# Patient Record
Sex: MALE | Race: WHITE | NOT HISPANIC OR LATINO
[De-identification: names, ages, dates, MRNs, and addresses within clinical notes are randomized per-mention and may not be internally consistent; named-entity substitution may affect disease eponyms.]

---

## 2017-02-21 PROBLEM — Z00.00 ENCOUNTER FOR PREVENTIVE HEALTH EXAMINATION: Status: ACTIVE | Noted: 2017-02-21

## 2017-02-23 ENCOUNTER — APPOINTMENT (OUTPATIENT)
Dept: ORTHOPEDIC SURGERY | Facility: CLINIC | Age: 27
End: 2017-02-23

## 2017-02-23 VITALS
BODY MASS INDEX: 28 KG/M2 | DIASTOLIC BLOOD PRESSURE: 72 MMHG | HEIGHT: 71 IN | WEIGHT: 200 LBS | SYSTOLIC BLOOD PRESSURE: 132 MMHG | HEART RATE: 66 BPM

## 2017-02-23 DIAGNOSIS — Z80.3 FAMILY HISTORY OF MALIGNANT NEOPLASM OF BREAST: ICD-10-CM

## 2017-02-23 DIAGNOSIS — Z87.09 PERSONAL HISTORY OF OTHER DISEASES OF THE RESPIRATORY SYSTEM: ICD-10-CM

## 2017-02-23 DIAGNOSIS — Z78.9 OTHER SPECIFIED HEALTH STATUS: ICD-10-CM

## 2017-02-23 RX ORDER — FLUTICASONE PROPIONATE 50 MCG
SPRAY, SUSPENSION NASAL
Refills: 0 | Status: ACTIVE | COMMUNITY

## 2017-03-17 ENCOUNTER — APPOINTMENT (OUTPATIENT)
Dept: ORTHOPEDIC SURGERY | Facility: CLINIC | Age: 27
End: 2017-03-17

## 2017-03-17 DIAGNOSIS — S62.346D NONDISPLACED FRACTURE OF BASE OF FIFTH METACARPAL BONE, RIGHT HAND, SUBSEQUENT ENCOUNTER FOR FRACTURE WITH ROUTINE HEALING: ICD-10-CM

## 2017-03-17 DIAGNOSIS — S62.346A NONDISPLACED FRACTURE OF BASE OF FIFTH METACARPAL BONE, RIGHT HAND, INITIAL ENCOUNTER FOR CLOSED FRACTURE: ICD-10-CM

## 2017-09-25 ENCOUNTER — APPOINTMENT (OUTPATIENT)
Dept: ORTHOPEDIC SURGERY | Facility: CLINIC | Age: 27
End: 2017-09-25
Payer: COMMERCIAL

## 2017-09-25 PROCEDURE — 99214 OFFICE O/P EST MOD 30 MIN: CPT

## 2017-10-18 ENCOUNTER — RESULT REVIEW (OUTPATIENT)
Age: 27
End: 2017-10-18

## 2017-10-31 ENCOUNTER — APPOINTMENT (OUTPATIENT)
Dept: ORTHOPEDIC SURGERY | Facility: CLINIC | Age: 27
End: 2017-10-31
Payer: COMMERCIAL

## 2017-10-31 DIAGNOSIS — M25.311 OTHER INSTABILITY, RIGHT SHOULDER: ICD-10-CM

## 2017-10-31 PROCEDURE — 99214 OFFICE O/P EST MOD 30 MIN: CPT

## 2017-10-31 PROCEDURE — 73030 X-RAY EXAM OF SHOULDER: CPT | Mod: RT

## 2017-12-15 ENCOUNTER — OUTPATIENT (OUTPATIENT)
Dept: OUTPATIENT SERVICES | Facility: HOSPITAL | Age: 27
LOS: 1 days | Discharge: HOME | End: 2017-12-15

## 2017-12-15 VITALS
OXYGEN SATURATION: 97 % | DIASTOLIC BLOOD PRESSURE: 73 MMHG | HEART RATE: 71 BPM | TEMPERATURE: 98 F | HEIGHT: 71 IN | WEIGHT: 216.71 LBS | SYSTOLIC BLOOD PRESSURE: 121 MMHG | RESPIRATION RATE: 18 BRPM

## 2017-12-15 DIAGNOSIS — Z01.818 ENCOUNTER FOR OTHER PREPROCEDURAL EXAMINATION: ICD-10-CM

## 2017-12-15 DIAGNOSIS — K21.9 GASTRO-ESOPHAGEAL REFLUX DISEASE WITHOUT ESOPHAGITIS: ICD-10-CM

## 2017-12-15 DIAGNOSIS — Z90.89 ACQUIRED ABSENCE OF OTHER ORGANS: Chronic | ICD-10-CM

## 2017-12-15 DIAGNOSIS — S43.431S SUPERIOR GLENOID LABRUM LESION OF RIGHT SHOULDER, SEQUELA: ICD-10-CM

## 2017-12-15 DIAGNOSIS — J30.2 OTHER SEASONAL ALLERGIC RHINITIS: ICD-10-CM

## 2017-12-15 NOTE — H&P PST ADULT - HISTORY OF PRESENT ILLNESS
27M pmh gerd, seasonal allergies c/o Right shoulder pain s/p fall ~1year ago found to have labral tear here for presurgical testing for scheduled Right shoulder arthroscopy 27M pmh gerd, seasonal allergies c/o Right shoulder pain s/p fall ~1year ago found to have superior glenoid labrum tear of right shoulder here for presurgical testing for scheduled Right shoulder arthroscopy

## 2017-12-15 NOTE — H&P PST ADULT - ASSESSMENT
27M pmh gerd, seasonal allergies c/o Right shoulder pain s/p fall ~1year ago found to have superior glenoid labrum tear of right shoulder here for presurgical testing for scheduled Right shoulder arthroscopy

## 2017-12-18 ENCOUNTER — APPOINTMENT (OUTPATIENT)
Dept: ORTHOPEDIC SURGERY | Facility: HOSPITAL | Age: 27
End: 2017-12-18

## 2017-12-18 ENCOUNTER — RESULT REVIEW (OUTPATIENT)
Age: 27
End: 2017-12-18

## 2017-12-18 ENCOUNTER — OUTPATIENT (OUTPATIENT)
Dept: OUTPATIENT SERVICES | Facility: HOSPITAL | Age: 27
LOS: 1 days | Discharge: HOME | End: 2017-12-18
Payer: COMMERCIAL

## 2017-12-18 VITALS
HEIGHT: 71 IN | HEART RATE: 78 BPM | TEMPERATURE: 98 F | RESPIRATION RATE: 17 BRPM | DIASTOLIC BLOOD PRESSURE: 73 MMHG | OXYGEN SATURATION: 100 % | SYSTOLIC BLOOD PRESSURE: 118 MMHG | WEIGHT: 210.1 LBS

## 2017-12-18 VITALS
DIASTOLIC BLOOD PRESSURE: 72 MMHG | OXYGEN SATURATION: 96 % | HEART RATE: 62 BPM | RESPIRATION RATE: 18 BRPM | SYSTOLIC BLOOD PRESSURE: 123 MMHG

## 2017-12-18 DIAGNOSIS — Z90.89 ACQUIRED ABSENCE OF OTHER ORGANS: Chronic | ICD-10-CM

## 2017-12-18 PROCEDURE — 29806 SHO ARTHRS SRG CAPSULORRAPHY: CPT | Mod: RT

## 2017-12-18 PROCEDURE — 29807 SHO ARTHRS SRG RPR SLAP LES: CPT | Mod: 59,RT

## 2017-12-18 PROCEDURE — 88304 TISSUE EXAM BY PATHOLOGIST: CPT | Mod: 26

## 2017-12-18 RX ORDER — ONDANSETRON 8 MG/1
4 TABLET, FILM COATED ORAL ONCE
Qty: 0 | Refills: 0 | Status: DISCONTINUED | OUTPATIENT
Start: 2017-12-18 | End: 2017-12-18

## 2017-12-18 RX ORDER — FEXOFENADINE HCL 30 MG
1 TABLET ORAL
Qty: 0 | Refills: 0 | COMMUNITY

## 2017-12-18 RX ORDER — OXYCODONE HYDROCHLORIDE 5 MG/1
1 TABLET ORAL
Qty: 42 | Refills: 0 | OUTPATIENT
Start: 2017-12-18 | End: 2017-12-24

## 2017-12-18 RX ORDER — IBUPROFEN 200 MG
2 TABLET ORAL
Qty: 0 | Refills: 0 | COMMUNITY

## 2017-12-18 RX ORDER — MORPHINE SULFATE 50 MG/1
4 CAPSULE, EXTENDED RELEASE ORAL
Qty: 0 | Refills: 0 | Status: DISCONTINUED | OUTPATIENT
Start: 2017-12-18 | End: 2017-12-18

## 2017-12-18 RX ORDER — SODIUM CHLORIDE 9 MG/ML
1000 INJECTION, SOLUTION INTRAVENOUS
Qty: 0 | Refills: 0 | Status: DISCONTINUED | OUTPATIENT
Start: 2017-12-18 | End: 2017-12-18

## 2017-12-18 RX ORDER — ONDANSETRON 8 MG/1
1 TABLET, FILM COATED ORAL
Qty: 42 | Refills: 0 | OUTPATIENT
Start: 2017-12-18 | End: 2017-12-24

## 2017-12-18 RX ORDER — FLUTICASONE PROPIONATE 50 MCG
1 SPRAY, SUSPENSION NASAL
Qty: 0 | Refills: 0 | COMMUNITY

## 2017-12-18 RX ORDER — DOCUSATE SODIUM 100 MG
1 CAPSULE ORAL
Qty: 21 | Refills: 0 | OUTPATIENT
Start: 2017-12-18 | End: 2017-12-24

## 2017-12-18 NOTE — ASU DISCHARGE PLAN (ADULT/PEDIATRIC). - NOTIFY
Bleeding that does not stop/Inability to Tolerate Liquids or Foods/Fever greater than 101/Swelling that continues/Pain not relieved by Medications

## 2017-12-18 NOTE — ASU DISCHARGE PLAN (ADULT/PEDIATRIC). - MEDICATION SUMMARY - MEDICATIONS TO TAKE
I will START or STAY ON the medications listed below when I get home from the hospital:    Advil 200 mg oral tablet  -- 2 tab(s) by mouth prn  -- Indication: For home medication    oxyCODONE 5 mg oral tablet  -- 1 tab(s) by mouth every 4 hours, As Needed -for severe pain MDD:6  -- Caution federal law prohibits the transfer of this drug to any person other  than the person for whom it was prescribed.  It is very important that you take or use this exactly as directed.  Do not skip doses or discontinue unless directed by your doctor.  May cause drowsiness.  Alcohol may intensify this effect.  Use care when operating dangerous machinery.  This prescription cannot be refilled.  Using more of this medication than prescribed may cause serious breathing problems.    -- Indication: For prn severe pain    Zofran 4 mg oral tablet  -- 1 tab(s) by mouth every 4 hours, As Needed for nausea.  -- Indication: For prn nausea    Allegra  -- 1 tab(s) by mouth once a day  -- Indication: For home med    Colace 100 mg oral capsule  -- 1 cap(s) by mouth 3 times a day, As Needed -for constipation MDD:3  -- Medication should be taken with plenty of water.    -- Indication: For prn constipation while taking pain medication    Flonase  -- 1 spray(s) into nose once a day (at bedtime)  -- Indication: For home med

## 2017-12-18 NOTE — BRIEF OPERATIVE NOTE - PROCEDURE
<<-----Click on this checkbox to enter Procedure Arthroscopic repair of SLAP lesion of shoulder  12/18/2017  and bankhart repair  Active  N[facility]ANE

## 2017-12-19 ENCOUNTER — TRANSCRIPTION ENCOUNTER (OUTPATIENT)
Age: 27
End: 2017-12-19

## 2017-12-20 DIAGNOSIS — M24.111 OTHER ARTICULAR CARTILAGE DISORDERS, RIGHT SHOULDER: ICD-10-CM

## 2017-12-20 DIAGNOSIS — J30.2 OTHER SEASONAL ALLERGIC RHINITIS: ICD-10-CM

## 2017-12-20 DIAGNOSIS — K21.9 GASTRO-ESOPHAGEAL REFLUX DISEASE WITHOUT ESOPHAGITIS: ICD-10-CM

## 2018-01-02 ENCOUNTER — APPOINTMENT (OUTPATIENT)
Dept: ORTHOPEDIC SURGERY | Facility: CLINIC | Age: 28
End: 2018-01-02
Payer: COMMERCIAL

## 2018-01-02 PROCEDURE — 99024 POSTOP FOLLOW-UP VISIT: CPT

## 2018-01-30 ENCOUNTER — APPOINTMENT (OUTPATIENT)
Dept: ORTHOPEDIC SURGERY | Facility: CLINIC | Age: 28
End: 2018-01-30
Payer: COMMERCIAL

## 2018-01-30 PROCEDURE — 99024 POSTOP FOLLOW-UP VISIT: CPT

## 2018-03-06 ENCOUNTER — APPOINTMENT (OUTPATIENT)
Dept: ORTHOPEDIC SURGERY | Facility: CLINIC | Age: 28
End: 2018-03-06
Payer: COMMERCIAL

## 2018-03-06 PROCEDURE — 99024 POSTOP FOLLOW-UP VISIT: CPT

## 2018-05-01 ENCOUNTER — APPOINTMENT (OUTPATIENT)
Dept: ORTHOPEDIC SURGERY | Facility: CLINIC | Age: 28
End: 2018-05-01
Payer: COMMERCIAL

## 2018-05-01 DIAGNOSIS — S43.491D OTHER SPRAIN OF RIGHT SHOULDER JOINT, SUBSEQUENT ENCOUNTER: ICD-10-CM

## 2018-05-01 PROCEDURE — 99213 OFFICE O/P EST LOW 20 MIN: CPT

## 2019-02-26 PROBLEM — J45.909 UNSPECIFIED ASTHMA, UNCOMPLICATED: Chronic | Status: ACTIVE | Noted: 2017-12-15

## 2019-02-26 PROBLEM — J30.2 OTHER SEASONAL ALLERGIC RHINITIS: Chronic | Status: ACTIVE | Noted: 2017-12-15

## 2019-02-26 PROBLEM — K21.9 GASTRO-ESOPHAGEAL REFLUX DISEASE WITHOUT ESOPHAGITIS: Chronic | Status: ACTIVE | Noted: 2017-12-15

## 2019-03-05 ENCOUNTER — APPOINTMENT (OUTPATIENT)
Dept: ORTHOPEDIC SURGERY | Facility: CLINIC | Age: 29
End: 2019-03-05
Payer: COMMERCIAL

## 2019-03-05 PROCEDURE — 99214 OFFICE O/P EST MOD 30 MIN: CPT

## 2019-03-05 PROCEDURE — 73030 X-RAY EXAM OF SHOULDER: CPT | Mod: LT

## 2019-03-05 NOTE — DISCUSSION/SUMMARY
[de-identified] : 28-year-old male presents with left shoulder pain. This began after a dislocation 10 years ago he tried rest physical therapy medications without relief. We'll obtain an MRI at this time to further evaluate will followup after MRI. All questions answered

## 2019-03-05 NOTE — HISTORY OF PRESENT ILLNESS
[de-identified] : 27yo male presents complaining of left chronic shoulder pain. He states he dislocated it approximately 10 years ago. At that time and was treated with physical therapy. Throughout the years his shoulder will intermittently flare up with pain depending on what he does. If he lifts weights incorrectly at the gym he was started developing for a few days. 2 weeks ago he slept on it wrong which caused him to have pain for several days. No recent injuries. Denies numbness tingling.

## 2019-03-05 NOTE — PHYSICAL EXAM
[de-identified] : General Exam\par \par Well developed, well nourished\par No apparent distress\par Oriented to person, place, and time\par Mood: Normal\par Affect: Normal\par Balance and coordination: Normal\par Gait: Normal\par \par Left shoulder exam\par \par Inspection: No swelling, ecchymosis or gross deformity.\par Skin: No masses, No lesions\par Neck: Negative Spurlings, full ROM without pain\par Tenderness: No bicipital tenderness, no tenderness to the greater tuberosity/RTC insertion, no anterior shoulder/lesser tuberosity tenderness. No tenderness SC joint, clavicle, AC joint.\par ROM: 160/60/T6\par Impingement tests: mild+ Omalley, +pain with post labral testing\par AC Joint: no pain with cross arm testing\par instability: pos apprehension, neg load and shift.  neg jerk\par Biceps: Negative speed\par Strength: 5/5 abduction, external rotation, and internal rotation\par Neuro: AIN, PIN, Ulnar nerve motor intact\par Sensation: Intact to light touch in radial, median, ulnar, and axillary nerve distributions\par Vasc: 2+ radial pulse\par  [de-identified] : \par The following radiographs were ordered and read by me during this patients visit. I reviewed each radiograph in detail with the patient and discussed the findings as highlighted below. \par \par 3 views left shoulder were obtained today that show no fracture, dislocation. There is no degenerative change seen. There is no malalignment. No obvious osseous abnormality. Otherwise unremarkable.\par \par

## 2019-03-08 ENCOUNTER — OTHER (OUTPATIENT)
Age: 29
End: 2019-03-08

## 2019-03-12 ENCOUNTER — APPOINTMENT (OUTPATIENT)
Dept: ORTHOPEDIC SURGERY | Facility: CLINIC | Age: 29
End: 2019-03-12
Payer: COMMERCIAL

## 2019-03-12 DIAGNOSIS — G89.29 PAIN IN LEFT SHOULDER: ICD-10-CM

## 2019-03-12 DIAGNOSIS — M25.512 PAIN IN LEFT SHOULDER: ICD-10-CM

## 2019-03-12 DIAGNOSIS — M25.312 OTHER INSTABILITY, LEFT SHOULDER: ICD-10-CM

## 2019-03-12 PROCEDURE — 99213 OFFICE O/P EST LOW 20 MIN: CPT | Mod: 25

## 2019-03-12 PROCEDURE — 20610 DRAIN/INJ JOINT/BURSA W/O US: CPT | Mod: LT

## 2019-03-12 NOTE — DISCUSSION/SUMMARY
[de-identified] : Injection: Right shoulder (Subacromial).\par Indication: bankart, post labral tearing\par \par A discussion was had with the patient regarding this procedure and all questions were answered. All risks, benefits and alternatives were discussed. These included but were not limited to bleeding, infection, and allergic reaction. Alcohol was used to clean the skin, and betadine was used to sterilize and prep the area in the posterior aspect of the right shoulder. Ethyl chloride spray was then used as a topical anesthetic. A 21-gauge needle was used to inject 4cc of 1% lidocaine and 1cc of 40mg/ml methylprednisolone into the right subacromial space. A sterile bandage was then applied. The patient tolerated the procedure well and there were no complications. [FreeTextEntry1] : \par \par 28-year-old male prior left shoulder dislocation with recurrent pain and instability. We discussed treatment options at length we'll surgical nonsurgical. We discussed nonsurgical treatment with activity modification therapy medications injections. We discussed that a lot of his labral tear or instability. We discussed risk of prolonged weight with glenoid bone loss and need for larger procedure. We discussed surgery which be a left shoulder arthroscopy and labral repair. He is entering his busy season for work and understands that he will need surgery a better time for him in the fall. He is requesting an injection today to help with pain. We discussed the risks of this at length\par \par Injection: Left shoulder (glenohumeral joint\par Indication: [Labral tear\par A discussion was had with the patient regarding this procedure and all questions were answered. All risks, benefits and alternatives were discussed. These included but were not limited to bleeding, infection, and allergic reaction. Alcohol was used to clean the skin, and betadine was used to sterilize and prep the area in the posterior aspect of the left shoulder. Ethyl chloride spray was then used as a topical anesthetic. A 21-gauge needle was used to inject 4cc of 1% lidocaine and 1cc of 40mg/ml methylprednisolone into the left glenohumeral joint. A sterile bandage was then applied. The patient tolerated the procedure well and there were no complications. \par All questions answered he will followup in the fall to discuss surgical treatment

## 2019-03-12 NOTE — HISTORY OF PRESENT ILLNESS
[de-identified] : 27yo male presents complaining of left chronic shoulder pain. He states he dislocated it approximately 10 years ago. At that time and was treated with physical therapy. Throughout the years his shoulder will intermittently flare up with pain depending on what he does. If he lifts weights incorrectly at the gym he was started developing for a few days. 2 weeks ago he slept on it wrong which caused him to have pain for several days. No recent injuries. Denies numbness tingling. \par A MRI was done since last visit, here to review results today.\par

## 2019-03-12 NOTE — PHYSICAL EXAM
[de-identified] : Left shoulder exam\par \par Inspection: No swelling, ecchymosis or gross deformity.\par Skin: No masses, No lesions\par Neck: Negative Spurlings, full ROM without pain\par Tenderness: No bicipital tenderness, no tenderness to the greater tuberosity/RTC insertion, no anterior shoulder/lesser tuberosity tenderness. No tenderness SC joint, clavicle, AC joint.\par ROM: 160/60/T6\par Impingement tests: mild+ Omalley, +pain with post labral testing\par AC Joint: no pain with cross arm testing\par instability: pos apprehension, neg load and shift. neg jerk\par Biceps: Negative speed\par Strength: 5/5 abduction, external rotation, and internal rotation\par Neuro: AIN, PIN, Ulnar nerve motor intact\par Sensation: Intact to light touch in radial, median, ulnar, and axillary nerve distributions\par Vasc: 2+ radial pulse [de-identified] : Left shoulder reviewed. Signs of prior dislocation there is a small Hill-Sachs fracture. There is an anterior inferior labral tear with small bony fragment. Tear extends posterior inferior.\par

## 2021-09-28 ENCOUNTER — APPOINTMENT (OUTPATIENT)
Dept: ORTHOPEDIC SURGERY | Facility: CLINIC | Age: 31
End: 2021-09-28
Payer: COMMERCIAL

## 2021-09-28 DIAGNOSIS — S43.431D SUPERIOR GLENOID LABRUM LESION OF RIGHT SHOULDER, SUBSEQUENT ENCOUNTER: ICD-10-CM

## 2021-09-28 DIAGNOSIS — S49.91XA UNSPECIFIED INJURY OF RIGHT SHOULDER AND UPPER ARM, INITIAL ENCOUNTER: ICD-10-CM

## 2021-09-28 PROCEDURE — 20610 DRAIN/INJ JOINT/BURSA W/O US: CPT | Mod: RT

## 2021-09-28 PROCEDURE — 99213 OFFICE O/P EST LOW 20 MIN: CPT | Mod: 25

## 2021-09-28 PROCEDURE — 73030 X-RAY EXAM OF SHOULDER: CPT | Mod: RT

## 2021-09-28 NOTE — HISTORY OF PRESENT ILLNESS
[de-identified] : 30yo male presents complaining of right shoulder pain for a few weeks. He states he swung something down to the ground he felt an immediate pain lateral posterior shoulder. He has pain with overhead activity reaching behind back. He tried ice, resting, taking Advil and Aleve. Overall symptoms are stable but not improving much.\par He also reports mild discomfort with his left shoulder that is intermittent. This is worse with activity, much unchanged since 2019 mri. \par \par s/p R shoulder bankart/slap repair 12/18/17.

## 2021-09-28 NOTE — PHYSICAL EXAM
[de-identified] : Right shoulder exam\par \par Inspection: No swelling, ecchymosis or gross deformity.\par Skin: No masses, No lesions\par Tenderness: No bicipital tenderness, no tenderness to the greater tuberosity/RTC insertion, no anterior shoulder/lesser tuberosity tenderness. No tenderness SC joint, clavicle, AC joint.\par ROM: 160/60/T6\par Instability: Positive apprehension negative anterior and posterior load-and-shift\par Impingement tests: Positive Omalley\par AC Joint: no pain with cross arm testing\par Biceps: Negative speed\par Strength: 5/5 abduction, external rotation, and internal rotation\par Neuro: AIN, PIN, Ulnar nerve motor intact\par Sensation: Intact to light touch in radial, median, ulnar, and axillary nerve distributions\par Vasc: 2+ radial pulse\par  [de-identified] : \par The following radiographs were ordered and read by me during this patients visit. I reviewed each radiograph in detail with the patient and discussed the findings as highlighted below. \par \par 3 views right shoulder obtained today.  There is postsurgical changes within the glenoid with anchor tracts visible

## 2021-09-28 NOTE — DISCUSSION/SUMMARY
[de-identified] : History of shoulder instability status post surgical intervention several years ago he now reports pain for 2 months.  Pain is in the anterior aspect of his shoulder he does have apprehension on examination.  I recommended a CT arthrogram to assess labral hearing as well as glenoid bone loss.  He is requesting injection today for pain.  We discussed that this is a temporary solution at best however he is insistent on an injection today\par \par Injection: Right glenohumeral joint.\par Indication: Labral tear if not improved I would recommend a CT arthrogram to assess for glenoid bone loss.\par \par A discussion was had with the patient regarding this procedure and all questions were answered. All risks, benefits and alternatives were discussed. These included but were not limited to bleeding, infection, and allergic reaction. Alcohol was used to clean the skin, and betadine was used to sterilize and prep the area in the posterior aspect of the right shoulder. Ethyl chloride spray was then used as a topical anesthetic. A 21-gauge needle was used to inject 7cc of 1% lidocaine and 1cc of 40mg/ml methylprednisolone into the right glenohumeral joint. A sterile bandage was then applied. The patient tolerated the procedure well and there were no complications. \par \par If not improved I would recommend a CT arthrogram to assess glenoid bone loss and assess labral healing

## 2022-03-14 NOTE — ASU DISCHARGE PLAN (ADULT/PEDIATRIC). - LAUNCH MEDICATION RECONCILIATION
<<-----Click here for Discharge Medication Review Discharge instructions and follow up reviewed with patient. Patient verbalized understanding. Discharged in stable condition with all personal belongings to lobby.

## 2024-07-15 ENCOUNTER — NON-APPOINTMENT (OUTPATIENT)
Age: 34
End: 2024-07-15

## 2025-02-13 NOTE — ASU PATIENT PROFILE, ADULT - PRO ARRIVE FROM
Medication Approved  
PATIENT would like a return phone call about medications being denied. Patient advised that a appeal will be filed for the Adderall and Chantix . He still want to talk to you asap  
Patient would like to speak with Bella directly concerning a medication issue. Thanks  
See prior message  
home